# Patient Record
(demographics unavailable — no encounter records)

---

## 2024-12-19 NOTE — HISTORY OF PRESENT ILLNESS
[FreeTextEntry1] : f/u hand eczema [de-identified] : Pt is a 38 year old F presenting for f/u of:  # Hand eczema - pt washes hands and sanitizes frequently, repeatedly gets flares on hands. hands are itchy. flares are well controlled w Clobetasol (pt uses once a day, flares resolve within one week of clobetasol use). pt is satisfied w current clobetasol regimen except that it is very greasy.  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

## 2024-12-19 NOTE — ASSESSMENT
[FreeTextEntry1] : # Atopic dermatitis, hand eczema subtype, mild-mod exacerbation of chronic disease  - Reviewed risks (as well as mitigation strategies for adverse drug events as applicable), benefits, and alternatives of therapy. discussed consideration of excimer or dupilumab if disease continues to be active despite treatment - discussed optimization of topical use  - switch to Clobetasol 0.05% cream to affected areas BID PRN flares. Can use for 2 weeks, then take 1 week off, repeat PRN. Do not apply to face/groin. Side effects discussed with pt including but not limited to thinning of the skin, atrophy and dyspigmentation.  discussed dupixent although this is an aggressive option   # Xerosis - Principles of dry skin care reviewed including: importance of using an emollient 2-3x/day, using gentle products, and avoiding fragranced products including soaps and detergent  RTC PRN

## 2024-12-19 NOTE — PHYSICAL EXAM
[Alert] : alert [Oriented x 3] : ~L oriented x 3 [Well Nourished] : well nourished [Conjunctiva Non-injected] : conjunctiva non-injected [No Visual Lymphadenopathy] : no visual  lymphadenopathy [No Clubbing] : no clubbing [No Edema] : no edema [No Bromhidrosis] : no bromhidrosis [No Chromhidrosis] : no chromhidrosis [FreeTextEntry3] : few eczematous patches on b/l hands\par  xerosis on hands

## 2025-07-23 NOTE — REVIEW OF SYSTEMS
[Negative] : Respiratory [Dyspnea on exertion] : not dyspnea during exertion [Chest Discomfort] : no chest discomfort [Lower Ext Edema] : no extremity edema [Palpitations] : no palpitations [Orthopnea] : no orthopnea [PND] : no PND [Abdominal Pain] : no abdominal pain [Nausea] : no nausea [Vomiting] : no vomiting [Heartburn] : no heartburn [Joint Pain] : no joint pain [Rash] : no rash [Itching] : no itching [Dizziness] : no dizziness [Tremor] : no tremor was seen [Confusion] : no confusion was observed [Anxiety] : no anxiety [Under Stress] : not under stress [Easy Bleeding] : no tendency for easy bleeding [Easy Bruising] : no tendency for easy bruising

## 2025-07-23 NOTE — PHYSICAL EXAM
[No Carotid Bruit] : no carotid bruit [Normal S1, S2] : normal S1, S2 [Soft] : abdomen soft [Non Tender] : non-tender [Normal Bowel Sounds] : normal bowel sounds [Normal] : alert and oriented, normal memory [de-identified] : 1/4 EDM in 2nd ICS.

## 2025-07-23 NOTE — DISCUSSION/SUMMARY
[FreeTextEntry1] : In a summary Dr. Mayorga, Allegra is a young- female with heart murmur and family history of thoracic aortic aneurysm, will get Echo to assess LV systolic function and Ascending aorta. Further plan based on Echo results.

## 2025-07-23 NOTE — HISTORY OF PRESENT ILLNESS
[FreeTextEntry1] : Allegra Espinal is a 39- year- old female with family history of Thoracic aortic aneurysm( father) comes for cardiac evaluation. Denies any chest pain or palpitations. No shortness of breath on exertion. Physically active. Genetic testing for Marfan's syndrome was negative.